# Patient Record
Sex: FEMALE | Race: WHITE | Employment: FULL TIME | URBAN - METROPOLITAN AREA
[De-identification: names, ages, dates, MRNs, and addresses within clinical notes are randomized per-mention and may not be internally consistent; named-entity substitution may affect disease eponyms.]

---

## 2018-07-05 ENCOUNTER — TELEPHONE (OUTPATIENT)
Dept: CARDIOLOGY CLINIC | Age: 48
End: 2018-07-05

## 2018-07-05 NOTE — TELEPHONE ENCOUNTER
Faxed records request to [facility].    Future Appointments  Date Time Provider Juliana Hunt   7/10/2018 3:40 PM Tobin Waters  [address] 7/6/18- Received records.

## 2018-07-10 ENCOUNTER — OFFICE VISIT (OUTPATIENT)
Dept: CARDIOLOGY CLINIC | Age: 48
End: 2018-07-10

## 2018-07-10 VITALS
HEIGHT: 67 IN | HEART RATE: 67 BPM | RESPIRATION RATE: 18 BRPM | DIASTOLIC BLOOD PRESSURE: 70 MMHG | WEIGHT: 143 LBS | OXYGEN SATURATION: 96 % | SYSTOLIC BLOOD PRESSURE: 110 MMHG | BODY MASS INDEX: 22.44 KG/M2

## 2018-07-10 DIAGNOSIS — R00.2 PALPITATIONS: ICD-10-CM

## 2018-07-10 DIAGNOSIS — I49.3 PVC'S (PREMATURE VENTRICULAR CONTRACTIONS): Primary | ICD-10-CM

## 2018-07-10 RX ORDER — CONJUGATED ESTROGENS/BAZEDOXIFENE .45; 2 MG/1; MG/1
TABLET, FILM COATED ORAL
Refills: 0 | COMMUNITY
Start: 2018-06-18

## 2018-07-10 RX ORDER — DULOXETIN HYDROCHLORIDE 30 MG/1
CAPSULE, DELAYED RELEASE ORAL
Refills: 1 | COMMUNITY
Start: 2018-06-02

## 2018-07-10 RX ORDER — LAMOTRIGINE 25 MG/1
50 TABLET ORAL DAILY
Refills: 1 | COMMUNITY
Start: 2018-05-20

## 2018-07-10 NOTE — PROGRESS NOTES
HISTORY OF PRESENT ILLNESS  Cheikh Culver is a 46 y.o. female     SUMMARY:   Problem List  Date Reviewed: 7/10/2018          Codes Class Noted    Palpitations ICD-10-CM: R00.2  ICD-9-CM: 785.1  7/10/2018        PVC's (premature ventricular contractions) ICD-10-CM: I49.3  ICD-9-CM: 427.69  7/10/2018              Current Outpatient Prescriptions on File Prior to Visit   Medication Sig    cetirizine (ZYRTEC) 10 mg tablet Take  by mouth.  multivitamin (ONE A DAY) tablet Take 1 Tab by mouth daily. No current facility-administered medications on file prior to visit. CARDIOLOGY STUDIES TO DATE:  None    Chief Complaint   Patient presents with    Irregular Heart Beat     HPI :  Ms. Murray Callahan is a 46year-old referred from [facility] for cardiac evaluation. She is basically healthy. She was in [facility] the middle of June with a swollen elbow and she mentioned that she had occasional skipped beats and on exam she was noted to have some skipped beats, so an EKG was obtained and I have reviewed it. It showed normal sinus rhythm with occasional unifocal PVCs. No significant ST-T wave changes or T-waves. She also has some mild orthostatic type symptoms, but she has never sought any medical attention for that. There is no history of hypertension or diabetes. She has never smoked. Family history is negative for premature coronary disease. She thinks her cholesterol may be slightly elevated. She cycles anywhere from 10 to 30+ miles at a time three to five days a week with no symptoms suggestive of angina or heart failure. She drinks minimal amounts of caffeine and does not drink alcohol. 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 She has frequent headaches, occasional constipation and has had issues with depression and anxiety.          CARDIAC ROS:   negative for chest pain, dyspnea, syncope, orthopnea, paroxysmal nocturnal dyspnea, exertional chest pressure/discomfort, claudication, lower extremity edema    Family History   Problem Relation Age of Onset    Cancer Father     Cancer Paternal Aunt     Cancer Paternal Uncle     Cancer Paternal Grandmother     Cancer Paternal Grandfather     Parkinson's Disease Mother     Kidney Disease Mother        Past Medical History:   Diagnosis Date    Essential hypertension     Family history of skin cancer     Skin cancer     Sun-damaged skin     Sunburn, blistering     Tanning bed exposure        GENERAL ROS:  A comprehensive review of systems was negative except for that written in the HPI. Visit Vitals    /70 (BP 1 Location: Left arm, BP Patient Position: Sitting)    Pulse 67    Resp 18    Ht 5' 7\" (1.702 m)    Wt 143 lb (64.9 kg)    SpO2 96%    BMI 22.4 kg/m2       Wt Readings from Last 3 Encounters:   07/10/18 143 lb (64.9 kg)   03/28/16 135 lb (61.2 kg)   03/28/16 135 lb (61.2 kg)            BP Readings from Last 3 Encounters:   07/10/18 110/70   03/28/16 136/84   03/28/16 136/84       PHYSICAL EXAM  General appearance: alert, cooperative, no distress, appears stated age  Neurologic: Alert and oriented X 3  Neck: supple, symmetrical, trachea midline, no adenopathy, no carotid bruit and no JVD  Lungs: clear to auscultation bilaterally  Heart: regular rate and rhythm, S1, S2 normal, no murmur, click, rub or gallop  Abdomen: soft, non-tender. Bowel sounds normal. No masses,  no organomegaly  Extremities: extremities normal, atraumatic, no cyanosis or edema  Pulses: 2+ and symmetric      ASSESSMENT  Ms. Osbaldo Deleon has minimally symptomatic PVCs of unknown etiology, perhaps stress related. She is able to exercise without any worrisome symptoms, so at this point I do not think she needs any further cardiac testing or treatment. We did talk about her mild orthostasis and some potential remedies for that. We also talked about symptoms to look for going forward. current treatment plan is effective, no change in therapy  lab results and schedule of future lab studies reviewed with patient  reviewed diet, exercise and weight control    Encounter Diagnoses   Name Primary?  PVC's (premature ventricular contractions) Yes    Palpitations      Orders Placed This Encounter    DULoxetine (CYMBALTA) 30 mg capsule    DUAVEE 0.45-20 mg tab    lamoTRIgine (LAMICTAL) 25 mg tablet       Follow-up Disposition:  Return in about 4 weeks (around 8/7/2018).     Latonya Purvis MD  7/10/2018

## 2018-07-10 NOTE — MR AVS SNAPSHOT
[facility] [address] [place] [address] 
[phone] Patient: Ema Sue MRN: [id number] Visit Information Date & Time Provider Department Dept. Phone Encounter #  
 7/10/2018  3:40 PM Amanda Guerrero MD [facility] [place] [phone] [id number] Follow-up Instructions Return in about 4 weeks (around 8/7/2018). Upcoming Health Maintenance Date Due DTaP/Tdap/Td series (1 - Tdap) 6/13/1989 PAP AKA CERVICAL CYTOLOGY 6/13/1989 BREAST CANCER SCRN MAMMOGRAM 6/13/2018 FOBT Q 1 YEAR AGE 50-75 6/13/2018 Influenza Age 5 to Adult 8/1/2018 Allergies as of 7/10/2018  Review Complete On: 7/10/2018 By: Amanda Guerrero MD  
  
 Severity Noted Reaction Type Reactions Other Food High 02/11/2016    Anaphylaxis  
 mammalian meats Fish Containing Products High 02/11/2016    Itching Shellfish Containing Products High 10/03/2012   Systemic Angioedema Sulfa (Sulfonamide Antibiotics)  10/03/2012   Systemic Rash Current Immunizations  Never Reviewed No immunizations on file. Not reviewed this visit You Were Diagnosed With   
  
 Codes Comments PVC's (premature ventricular contractions)    -  Primary ICD-10-CM: I49.3 ICD-9-CM: 427.69 Palpitations     ICD-10-CM: R00.2 ICD-9-CM: 785.1 Vitals BP Pulse Resp Height(growth percentile) Weight(growth percentile) SpO2  
 110/70 (BP 1 Location: Left arm, BP Patient Position: Sitting) 67 18 5' 7\" (1.702 m) 143 lb (64.9 kg) 96% BMI OB Status Smoking Status 22.4 kg/m2 Ablation Never Smoker Vitals History BMI and BSA Data Body Mass Index Body Surface Area  
 22.4 kg/m 2 1.75 m 2 Preferred Pharmacy Pharmacy Name Phone [address] [facility] [address] AT [facility] [phone]. [phone] Your Updated Medication List  
  
   
 This list is accurate as of 7/10/18  3:57 PM.  Always use your most recent med list.  
  
  
  
  
 cetirizine 10 mg tablet Commonly known as:  ZYRTEC Take  by mouth. DUAVEE 0.45-20 mg Tab Generic drug:  conj estrogens-bazedoxifene TK 1 T PO QD  
  
 DULoxetine 30 mg capsule Commonly known as:  CYMBALTA TAKE ONE CAPSULE BY MOUTH EVERY MORNING  
  
 lamoTRIgine 25 mg tablet Commonly known as: LaMICtal  
Take 50 mg by mouth daily. multivitamin tablet Commonly known as:  ONE A DAY Take 1 Tab by mouth daily. Follow-up Instructions Return in about 4 weeks (around 8/7/2018). Introducing [facility]! Dear Mila: 
Thank you for requesting a [email or web address] account. Our records indicate that you already have an active [email or web address] account. You can access your account anytime at [email or web address] Did you know that you can access your hospital and ER discharge instructions at any time in [email or web address]? You can also review all of your test results from your hospital stay or ER visit. Additional Information If you have questions, please visit the Frequently Asked Questions section of the [email or web address] website at [email or web address] Remember, [email or web address] is NOT to be used for urgent needs. For medical emergencies, dial 911. Now available from your iPhone and Android! Please provide this summary of care documentation to your next provider. Your primary care clinician is listed as Juanita Tolliver. If you have any questions after today's visit, please call [phone].

## 2021-05-04 ENCOUNTER — TRANSCRIBE ORDER (OUTPATIENT)
Dept: SCHEDULING | Age: 51
End: 2021-05-04

## 2022-03-18 PROBLEM — R00.2 PALPITATIONS: Status: ACTIVE | Noted: 2018-07-10

## 2022-03-19 PROBLEM — I49.3 PVC'S (PREMATURE VENTRICULAR CONTRACTIONS): Status: ACTIVE | Noted: 2018-07-10

## 2022-11-02 ENCOUNTER — TRANSCRIBE ORDER (OUTPATIENT)
Dept: SCHEDULING | Age: 52
End: 2022-11-02

## 2022-11-02 DIAGNOSIS — R94.5 NONSPECIFIC ABNORMAL RESULTS OF LIVER FUNCTION STUDY: ICD-10-CM

## 2022-11-02 DIAGNOSIS — E80.7 DISORDER OF BILIRUBIN METABOLISM: Primary | ICD-10-CM

## 2022-12-08 ENCOUNTER — HOSPITAL ENCOUNTER (OUTPATIENT)
Dept: ULTRASOUND IMAGING | Age: 52
Discharge: HOME | End: 2022-12-08
Attending: GENERAL PRACTICE
Payer: COMMERCIAL

## 2022-12-08 DIAGNOSIS — E80.7 DISORDER OF BILIRUBIN METABOLISM: ICD-10-CM

## 2022-12-08 DIAGNOSIS — R94.5 NONSPECIFIC ABNORMAL RESULTS OF LIVER FUNCTION STUDY: ICD-10-CM

## 2022-12-08 PROCEDURE — 76700 US EXAM ABDOM COMPLETE: CPT

## 2023-06-30 ENCOUNTER — HOSPITAL ENCOUNTER (OUTPATIENT)
Facility: HOSPITAL | Age: 53
Discharge: HOME | End: 2023-06-30
Payer: COMMERCIAL

## 2023-06-30 DIAGNOSIS — R91.1 SOLITARY PULMONARY NODULE: ICD-10-CM

## 2023-06-30 DIAGNOSIS — J98.4 OTHER DISORDERS OF LUNG: ICD-10-CM

## 2023-06-30 PROCEDURE — 71250 CT THORAX DX C-: CPT
